# Patient Record
Sex: MALE | Race: WHITE | Employment: OTHER | URBAN - NONMETROPOLITAN AREA
[De-identification: names, ages, dates, MRNs, and addresses within clinical notes are randomized per-mention and may not be internally consistent; named-entity substitution may affect disease eponyms.]

---

## 2018-06-08 PROBLEM — I10 ESSENTIAL HYPERTENSION: Status: ACTIVE | Noted: 2018-06-08

## 2018-06-08 PROBLEM — L71.9 ROSACEA: Status: ACTIVE | Noted: 2017-07-12

## 2018-06-08 PROBLEM — E03.8 OTHER SPECIFIED HYPOTHYROIDISM: Status: ACTIVE | Noted: 2018-06-08

## 2018-06-08 PROBLEM — Z96.1 PSEUDOPHAKIA OF RIGHT EYE: Status: ACTIVE | Noted: 2017-07-12

## 2019-02-04 PROBLEM — Z95.810 PRESENCE OF AUTOMATIC CARDIOVERTER/DEFIBRILLATOR (AICD): Status: ACTIVE | Noted: 2019-02-04

## 2019-07-12 ENCOUNTER — HOSPITAL ENCOUNTER (OUTPATIENT)
Age: 79
Setting detail: SPECIMEN
Discharge: HOME | End: 2019-07-12
Payer: MEDICARE

## 2019-07-12 PROCEDURE — 81001 URINALYSIS AUTO W/SCOPE: CPT

## 2019-07-12 PROCEDURE — 87077 CULTURE AEROBIC IDENTIFY: CPT

## 2019-07-12 PROCEDURE — 87086 URINE CULTURE/COLONY COUNT: CPT

## 2019-07-12 PROCEDURE — 87186 SC STD MICRODIL/AGAR DIL: CPT

## 2019-07-15 ENCOUNTER — TELEPHONE (OUTPATIENT)
Dept: UROLOGY | Age: 79
End: 2019-07-15

## 2019-07-15 LAB
CULTURE: ABNORMAL
CULTURE: ABNORMAL
Lab: ABNORMAL
SPECIMEN DESCRIPTION: ABNORMAL

## 2019-07-26 ENCOUNTER — TELEPHONE (OUTPATIENT)
Dept: UROLOGY | Age: 79
End: 2019-07-26

## 2019-08-16 LAB
-: ABNORMAL
AMORPHOUS: ABNORMAL
BACTERIA: ABNORMAL
BILIRUBIN URINE: ABNORMAL
CASTS UA: ABNORMAL /LPF (ref 0–2)
COLOR: ABNORMAL
COMMENT UA: ABNORMAL
CRYSTALS, UA: ABNORMAL /HPF
CRYSTALS, UA: ABNORMAL /HPF
EPITHELIAL CELLS UA: ABNORMAL /HPF (ref 0–5)
GLUCOSE URINE: ABNORMAL
KETONES, URINE: ABNORMAL
LEUKOCYTE ESTERASE, URINE: ABNORMAL
MUCUS: ABNORMAL
NITRITE, URINE: ABNORMAL
OTHER OBSERVATIONS UA: ABNORMAL
PH UA: ABNORMAL (ref 5–9)
PROTEIN UA: ABNORMAL
RBC UA: ABNORMAL /HPF (ref 0–2)
RENAL EPITHELIAL, UA: ABNORMAL /HPF
SPECIFIC GRAVITY UA: ABNORMAL (ref 1.01–1.02)
TRICHOMONAS: ABNORMAL
TURBIDITY: ABNORMAL
URINE HGB: ABNORMAL
UROBILINOGEN, URINE: ABNORMAL
WBC UA: ABNORMAL /HPF (ref 0–5)
YEAST: ABNORMAL

## 2019-09-05 PROBLEM — M25.552 CHRONIC PAIN OF BOTH HIPS: Status: ACTIVE | Noted: 2019-09-05

## 2019-09-05 PROBLEM — M25.551 CHRONIC PAIN OF BOTH HIPS: Status: ACTIVE | Noted: 2019-09-05

## 2019-09-05 PROBLEM — G89.29 CHRONIC PAIN OF BOTH HIPS: Status: ACTIVE | Noted: 2019-09-05

## 2021-07-23 PROBLEM — D68.51 FACTOR V LEIDEN (HCC): Status: ACTIVE | Noted: 2021-07-23

## 2024-03-04 ENCOUNTER — CLINICAL SUPPORT (OUTPATIENT)
Dept: AUDIOLOGY | Facility: CLINIC | Age: 84
End: 2024-03-04
Payer: MEDICARE

## 2024-03-04 DIAGNOSIS — H90.3 SENSORINEURAL HEARING LOSS (SNHL) OF BOTH EARS: Primary | ICD-10-CM

## 2024-03-04 PROCEDURE — 92557 COMPREHENSIVE HEARING TEST: CPT | Performed by: AUDIOLOGIST

## 2024-03-04 NOTE — PROGRESS NOTES
[facility]  YEARLY HEARING AID APPOINTMENT      Name:  Cheo Aquino  YOB: 1940  Today's date:  03/04/24      AUDIOMETRIC RESULTS  Otoscopic was clear.  Audiogram revealed a stable mild to severe sensorineural hearing loss, bilaterally.    HEARING AID INSPECTION AND ADJUSTMENT  Hearing aids were cleaned and checked.  Replaced filters, domes and sport locks.  Listening check was good.       Patient reports that the right aid has been intermittent.  Could not duplicate in the office.  No need for adjustment today.    Discussed options if the right aid continues to be intermittent.    FOLLOW UP   The patient was asked to contact the office if they notice any significant change in hearing level or hearing aid function.    Otherwise, will follow up again in 1 year.    APPOINTMENT TIME  10:00-11:00am    Brad Pearl  Doctor of Audiology  Licensed Audiologist

## 2025-03-06 ENCOUNTER — APPOINTMENT (OUTPATIENT)
Dept: AUDIOLOGY | Facility: CLINIC | Age: 85
End: 2025-03-06
Payer: MEDICARE

## 2025-03-06 DIAGNOSIS — H90.3 SENSORINEURAL HEARING LOSS (SNHL) OF BOTH EARS: Primary | ICD-10-CM

## 2025-03-06 PROCEDURE — V5267 HEARING AID SUP/ACCESS/DEV: HCPCS | Performed by: AUDIOLOGIST

## 2025-03-06 PROCEDURE — 92557 COMPREHENSIVE HEARING TEST: CPT | Performed by: AUDIOLOGIST

## 2025-03-06 NOTE — PROGRESS NOTES
COMPREHENSIVE AUDIOMETRIC EVALUATION      Name:  Cheo Aquino  :  1940  Age:  85 y.o.  Date of Evaluation:  25   Referring Provider:  SELF     History:  Mr. Aquino was seen today for an evaluation of hearing.  Please recall, patient has a known sensorineural hearing loss for which he utilizes hearing aids, bilaterally.  Today, patient reported a gradual decrease in hearing.  When asked, patient denied otalgia, aural fullness, and tinnitus    See audiometric evaluation at end of this report or scanned under media tab    OTOSCOPY:       Right Ear: Clear canal       Left Ear: Clear canal    AUDIOMETRIC EVALUATION (Inserts):       Right Ear: Mild sloping to Severe, Sensorineural hearing loss                 Left Ear: Mild sloping to Severe, Sensorineural hearing loss           NOTE: Hearing sensitivity decreased in the right ear at 500 Hz as compared to most recent audiometric evaluation 3/4/2024    Test technique:  Standard Audiometry  Reliability:   good    SPEECH RECOGNITION THRESHOLD:       Right Ear:  45 dBHL in good agreement with PTA       Left Ear:  45 dBHL in good agreement with PTA    WORD RECOGNITION:       Right Ear:  100%  excellent (%) at elevated presentation level       Left Ear:   100%  excellent (%) at elevated presentation level    DISCUSSION:   Discussed results and recommendations with patient.  Questions were addressed and the patient was encouraged to contact our department should concerns arise.    RECOMMENDATIONS:  -Recommend patient continue consistent utilization of hearing aids and follow up with audiologist.  -Recommend patient return for repeated audiometric evaluation should concerns for changes in hearing sensitivity arise or as medically indicated.      HEARING AID CHECK    Repair Warranty: out of warranty   L&D Warranty: out of warranty   HA Office Visits: out of warranty     Cheo Aquino was seen for a hearing aid check following ENT and audiometric evaluation  appts. Patient reported he has gone through a significant amount of batteries for the right ear and a battery can last anywhere from 15 minutes to 1 day.  He additionally noted when he uses both devices his left hearing aid seems to die quickly as well.  Listening check revealed good working function of devices.  Hearing aids were cleaned and domes and wax guards changed. Hearing aids reprogrammed to today's evaluation.  Discussed upgrading to new hearing aids however, patient would like right hearing aid sent in for repair.  Discussed with patient that right hearing aid repair would cost ~$300 though may be more due to age of the device.  Noted he would be notified if the price was different than expected and we would ask for a quote. Patient satisfied.    $35 HAC with programming (, H90.3)    RECOMMENDATIONS:  -Recommend patient return in ~6 months-1 year with Brad Pearl, CCC-A or sooner should concerns arise.    LAVON Rosario Audiology Student     Brad Hooks, CCC-A    Appt time: 10:00 - 11:00 AM

## 2025-03-12 ENCOUNTER — DOCUMENTATION (OUTPATIENT)
Dept: AUDIOLOGY | Facility: CLINIC | Age: 85
End: 2025-03-12
Payer: MEDICARE

## 2025-03-12 DIAGNOSIS — H90.3 SENSORINEURAL HEARING LOSS (SNHL) OF BOTH EARS: Primary | ICD-10-CM

## 2025-03-12 NOTE — PROGRESS NOTES
[facility]  WALK IN  HEARING AID CHECK      Name:  Cheo Aquino  YOB: 1940  Today's date:  03/12/25    Received repaired hearing aid from [facility].  Listening check good.  Patient settings restored.    Called and spoke to the patient to  the hearing aid at the .    CHARGES  $300.00 hearing aid repair >5 years old    Brad Pearl  Doctor of Audiology  Senior Audiologist

## 2026-03-10 ENCOUNTER — APPOINTMENT (OUTPATIENT)
Dept: AUDIOLOGY | Facility: CLINIC | Age: 86
End: 2026-03-10
Payer: MEDICARE